# Patient Record
Sex: FEMALE | URBAN - METROPOLITAN AREA
[De-identification: names, ages, dates, MRNs, and addresses within clinical notes are randomized per-mention and may not be internally consistent; named-entity substitution may affect disease eponyms.]

---

## 2025-07-07 PROBLEM — Z00.00 ENCOUNTER FOR PREVENTIVE HEALTH EXAMINATION: Status: ACTIVE | Noted: 2025-07-07

## 2025-07-11 ENCOUNTER — EMERGENCY (EMERGENCY)
Facility: HOSPITAL | Age: 30
LOS: 0 days | Discharge: HOME | End: 2025-07-11
Attending: EMERGENCY MEDICINE
Payer: COMMERCIAL

## 2025-07-11 VITALS
WEIGHT: 156.09 LBS | HEART RATE: 90 BPM | DIASTOLIC BLOOD PRESSURE: 85 MMHG | OXYGEN SATURATION: 98 % | SYSTOLIC BLOOD PRESSURE: 130 MMHG | TEMPERATURE: 100 F | RESPIRATION RATE: 18 BRPM

## 2025-07-11 LAB
ALBUMIN SERPL ELPH-MCNC: 4.4 G/DL — SIGNIFICANT CHANGE UP (ref 3.5–5.2)
ALP SERPL-CCNC: 33 U/L — SIGNIFICANT CHANGE UP (ref 30–115)
ALT FLD-CCNC: 13 U/L — SIGNIFICANT CHANGE UP (ref 0–41)
ANION GAP SERPL CALC-SCNC: 11 MMOL/L — SIGNIFICANT CHANGE UP (ref 7–14)
APPEARANCE UR: CLEAR — SIGNIFICANT CHANGE UP
AST SERPL-CCNC: 17 U/L — SIGNIFICANT CHANGE UP (ref 0–41)
BASOPHILS # BLD AUTO: 0.04 K/UL — SIGNIFICANT CHANGE UP (ref 0–0.2)
BASOPHILS NFR BLD AUTO: 0.5 % — SIGNIFICANT CHANGE UP (ref 0–1)
BILIRUB SERPL-MCNC: 0.4 MG/DL — SIGNIFICANT CHANGE UP (ref 0.2–1.2)
BILIRUB UR-MCNC: NEGATIVE — SIGNIFICANT CHANGE UP
BUN SERPL-MCNC: 9 MG/DL — LOW (ref 10–20)
CALCIUM SERPL-MCNC: 9.4 MG/DL — SIGNIFICANT CHANGE UP (ref 8.4–10.5)
CHLORIDE SERPL-SCNC: 103 MMOL/L — SIGNIFICANT CHANGE UP (ref 98–110)
CO2 SERPL-SCNC: 24 MMOL/L — SIGNIFICANT CHANGE UP (ref 17–32)
COLOR SPEC: YELLOW — SIGNIFICANT CHANGE UP
CREAT SERPL-MCNC: 0.7 MG/DL — SIGNIFICANT CHANGE UP (ref 0.7–1.5)
DIFF PNL FLD: ABNORMAL
EGFR: 119 ML/MIN/1.73M2 — SIGNIFICANT CHANGE UP
EGFR: 119 ML/MIN/1.73M2 — SIGNIFICANT CHANGE UP
EOSINOPHIL # BLD AUTO: 0.23 K/UL — SIGNIFICANT CHANGE UP (ref 0–0.7)
EOSINOPHIL NFR BLD AUTO: 3 % — SIGNIFICANT CHANGE UP (ref 0–8)
GLUCOSE SERPL-MCNC: 90 MG/DL — SIGNIFICANT CHANGE UP (ref 70–99)
GLUCOSE UR QL: NEGATIVE MG/DL — SIGNIFICANT CHANGE UP
HCG SERPL-ACNC: 514.2 MIU/ML — HIGH
HCT VFR BLD CALC: 38.8 % — SIGNIFICANT CHANGE UP (ref 37–47)
HGB BLD-MCNC: 13.1 G/DL — SIGNIFICANT CHANGE UP (ref 12–16)
IMM GRANULOCYTES NFR BLD AUTO: 0.3 % — SIGNIFICANT CHANGE UP (ref 0.1–0.3)
KETONES UR QL: NEGATIVE MG/DL — SIGNIFICANT CHANGE UP
LEUKOCYTE ESTERASE UR-ACNC: NEGATIVE — SIGNIFICANT CHANGE UP
LYMPHOCYTES # BLD AUTO: 1.31 K/UL — SIGNIFICANT CHANGE UP (ref 1.2–3.4)
LYMPHOCYTES # BLD AUTO: 16.8 % — LOW (ref 20.5–51.1)
MCHC RBC-ENTMCNC: 31 PG — SIGNIFICANT CHANGE UP (ref 27–31)
MCHC RBC-ENTMCNC: 33.8 G/DL — SIGNIFICANT CHANGE UP (ref 32–37)
MCV RBC AUTO: 91.7 FL — SIGNIFICANT CHANGE UP (ref 81–99)
MONOCYTES # BLD AUTO: 0.37 K/UL — SIGNIFICANT CHANGE UP (ref 0.1–0.6)
MONOCYTES NFR BLD AUTO: 4.8 % — SIGNIFICANT CHANGE UP (ref 1.7–9.3)
NEUTROPHILS # BLD AUTO: 5.81 K/UL — SIGNIFICANT CHANGE UP (ref 1.4–6.5)
NEUTROPHILS NFR BLD AUTO: 74.6 % — SIGNIFICANT CHANGE UP (ref 42.2–75.2)
NITRITE UR-MCNC: NEGATIVE — SIGNIFICANT CHANGE UP
NRBC BLD AUTO-RTO: 0 /100 WBCS — SIGNIFICANT CHANGE UP (ref 0–0)
PH UR: 7.5 — SIGNIFICANT CHANGE UP (ref 5–8)
PLATELET # BLD AUTO: 186 K/UL — SIGNIFICANT CHANGE UP (ref 130–400)
PMV BLD: 11.2 FL — HIGH (ref 7.4–10.4)
POTASSIUM SERPL-MCNC: 4.1 MMOL/L — SIGNIFICANT CHANGE UP (ref 3.5–5)
POTASSIUM SERPL-SCNC: 4.1 MMOL/L — SIGNIFICANT CHANGE UP (ref 3.5–5)
PROT SERPL-MCNC: 7.4 G/DL — SIGNIFICANT CHANGE UP (ref 6–8)
PROT UR-MCNC: NEGATIVE MG/DL — SIGNIFICANT CHANGE UP
RBC # BLD: 4.23 M/UL — SIGNIFICANT CHANGE UP (ref 4.2–5.4)
RBC # FLD: 13 % — SIGNIFICANT CHANGE UP (ref 11.5–14.5)
SODIUM SERPL-SCNC: 138 MMOL/L — SIGNIFICANT CHANGE UP (ref 135–146)
SP GR SPEC: 1.01 — SIGNIFICANT CHANGE UP (ref 1–1.03)
UROBILINOGEN FLD QL: 0.2 MG/DL — SIGNIFICANT CHANGE UP (ref 0.2–1)
WBC # BLD: 7.78 K/UL — SIGNIFICANT CHANGE UP (ref 4.8–10.8)
WBC # FLD AUTO: 7.78 K/UL — SIGNIFICANT CHANGE UP (ref 4.8–10.8)

## 2025-07-11 PROCEDURE — 76856 US EXAM PELVIC COMPLETE: CPT | Mod: 26

## 2025-07-11 PROCEDURE — 76830 TRANSVAGINAL US NON-OB: CPT

## 2025-07-11 PROCEDURE — 76856 US EXAM PELVIC COMPLETE: CPT

## 2025-07-11 PROCEDURE — 99284 EMERGENCY DEPT VISIT MOD MDM: CPT | Mod: 25

## 2025-07-11 PROCEDURE — 86900 BLOOD TYPING SEROLOGIC ABO: CPT

## 2025-07-11 PROCEDURE — 36415 COLL VENOUS BLD VENIPUNCTURE: CPT

## 2025-07-11 PROCEDURE — 76830 TRANSVAGINAL US NON-OB: CPT | Mod: 26

## 2025-07-11 PROCEDURE — 84702 CHORIONIC GONADOTROPIN TEST: CPT

## 2025-07-11 PROCEDURE — 36000 PLACE NEEDLE IN VEIN: CPT

## 2025-07-11 PROCEDURE — 86850 RBC ANTIBODY SCREEN: CPT

## 2025-07-11 PROCEDURE — 99285 EMERGENCY DEPT VISIT HI MDM: CPT

## 2025-07-11 PROCEDURE — 87086 URINE CULTURE/COLONY COUNT: CPT

## 2025-07-11 PROCEDURE — 80053 COMPREHEN METABOLIC PANEL: CPT

## 2025-07-11 PROCEDURE — 85025 COMPLETE CBC W/AUTO DIFF WBC: CPT

## 2025-07-11 PROCEDURE — 86901 BLOOD TYPING SEROLOGIC RH(D): CPT

## 2025-07-11 PROCEDURE — 81001 URINALYSIS AUTO W/SCOPE: CPT

## 2025-07-11 RX ADMIN — Medication 1000 MILLILITER(S): at 10:00

## 2025-07-11 NOTE — ED PROVIDER NOTE - NSFOLLOWUPINSTRUCTIONS_ED_ALL_ED_FT
OBGYN  Our Emergency Department Referral Coordinators will be reaching out to you in the next 24-48 hours from 9:00am to 5:00pm with a follow up appointment. Please expect a phone call from the hospital in that time frame. If you do not receive a call or if you have any questions or concerns, you can reach them at   [phone]     Vaginal bleeding in early pregnancy  Email this page to a friend Print [employer]  Vaginal bleeding during pregnancy is any discharge of blood from the vagina. It can happen any time from conception (when the egg is fertilized) to the end of pregnancy.    Some women have vaginal bleeding during their first 20 weeks of pregnancy.    The Difference Between Spotting and Bleeding  Spotting is when you notice a few drops of blood every now and then on your underwear. It is not enough to cover a panty liner.    Bleeding is a heavier flow of blood. With bleeding, you will need a liner or pad to keep the blood from soaking your clothes.    Ask your health care provider more about the difference between spotting and bleeding at one of your first prenatal visits.    Should I Worry about Spotting?  Some spotting is normal very early in pregnancy. Still, it is a good idea to tell your provider about it.    If you have had an ultrasound that confirms you have a normal pregnancy, contact your provider the day you first see the spotting.    If you have spotting and have not yet had an ultrasound, contact your provider right away. Spotting can be a sign of a pregnancy where the fertilized egg develops outside the uterus (ectopic pregnancy). An untreated ectopic pregnancy can be life-threatening for the woman.    What Causes Vaginal Bleeding?  Bleeding in the 1st trimester is not always a problem. It may be caused by:    Having sex.  An infection.  The fertilized egg implanting in the uterus.  Hormone changes.  Other factors that will not harm the woman or baby.  A threatened miscarriage. Many threatened miscarriages do not progress to pregnancy loss.  More serious causes of first-trimester bleeding include:    A miscarriage, which is the loss of the pregnancy before the embryo or fetus can live on its own outside the uterus. Almost all women who miscarry will have bleeding before a miscarriage.  An ectopic pregnancy, which may cause bleeding and cramping.  A molar pregnancy, in which a fertilized egg implants in the uterus but will not come to term.  Formation of a blood clot between the amniotic sac and the wall of the womb called a subchorionic hematoma.  What Will My Provider Need to Know?  Your provider may need to know these things to find the cause of your vaginal bleeding:    How far along is your pregnancy?  Have you had vaginal bleeding during this or an earlier pregnancy?  When did your bleeding begin?  Does it stop and start, or is it a steady flow?  How much blood is there?  What is the color of the blood?  Does the blood have an odor?  Do you have cramps or pain?  Do you feel weak or tired?  Have you fainted or felt dizzy?  Do you have nausea, vomiting, or diarrhea?  Do you have a fever?  Have you been injured, such as in a fall?  Have you changed your physical activity?  Do you have any extra stress?  When did you last have sex? Did you bleed afterward?  What is your blood type? Your provider can test your blood type. If it is Rh negative, you will need treatment with a medicine called Rho(D) immune globulin to prevent complications with future pregnancies.  Treatment for Vaginal Bleeding  Most of the time, the treatment for bleeding is rest. It is important to see your provider and have testing done to find the cause of your bleeding. Your provider may advise you to:    Take time off work  Stay off your feet  Not have sex  Not douche (NEVER do this during pregnancy, and also avoid it when you are not pregnant)  Not use tampons  Very heavy bleeding may require a hospital stay or surgical procedure.    What if I Discharge More than Blood?  If something other than blood comes out, contact your provider right away. Your provider will do an exam to look at your cervix.    Your provider will check to see if you are still pregnant. You will be closely watched with blood tests to see if you are still pregnant.    If you are no longer pregnant, you may need more care from your provider, such as medicine or possibly surgery.    When to Call the Doctor  Contact or go to your provider right away if you have:    Heavy bleeding  Bleeding with pain or cramping  Dizziness and bleeding  Pain in your belly or pelvis  If you cannot reach your provider, go to the emergency room.    If your bleeding has stopped, you still need to contact your provider. Your provider will need to find out what caused your bleeding.

## 2025-07-11 NOTE — ED PROVIDER NOTE - PHYSICAL EXAMINATION
CONSTITUTIONAL: well-appearing, in NAD  SKIN: Warm dry, normal skin turgor  HEAD: NCAT  EYES: EOMI, PERRLA, no scleral icterus, conjunctiva pink  ENT: normal pharynx with no erythema or exudates  NECK: Supple; non tender. Full ROM.  CARD: RRR  RESP: clear to ausculation b/l. No crackles or wheezing.  ABD: soft, non-tender, non-distended, no rebound or guarding.  EXT: Full ROM,   NEURO: normal motor. normal sensory. Normal gait.  PSYCH: Cooperative, appropriate.

## 2025-07-11 NOTE — ED PROVIDER NOTE - NSFOLLOWUPCLINICS_GEN_ALL_ED_FT
[facility]  OB/GYN  [address] [place] [address]  Phone: [phone]  Fax:

## 2025-07-11 NOTE — ED ADULT NURSE NOTE - FINAL NURSING ELECTRONIC SIGNATURE
PAIN MANAGEMENT FOLLOW-UP NOTE  6/14/21    CHIEF COMPLAINT: This is a71 y.o. male patientwho returns to the Pain Management Clinic with a history of Back Pain (2 month, follow up from injection, radiate to dar legs)      PAIN HPI:Max Esparza returns today for  reevaluation. Since the visit, the patient reports that the pain is not changed. Still  Pain in B lumbar  Not better   worse when wakes up  From sleep    can  Ride a bicycle  But cant walk or stand      Location: Back  Location Modifier: entire back  Severity of Pain: 3  Duration of Pain: Intermittent  Frequency of Pain: Intermittent  Aggravating Factors: -  Limiting Behavior: Standing   Relieving Factors: relaxation        Previous pain medication trials have included:          Mental health:    Patient feels - secondary to their current pain problems as described above. H/O depression and anxiety: No   Patient is not seeing psychologist orpsychiatrist   Abuse history? No    Employed? No    ANALGESIA:   Are your Current Pain medication (s) helping to decrease pain? No.   Current Pain score: Pain Score:   8    ADVERSE AFFECTS:   Medication Side Effects: No.    ACTIVITY:  Are you able to be more active with your pain medications? No      ABERRANT BEHAVIORS SINCE LAST VISIT? No    Review of Systems   Constitutional: Positive for fatigue. HENT: Negative. Eyes: Negative. Respiratory: Negative. Cardiovascular: Negative. Gastrointestinal: Negative for constipation and nausea. Endocrine: Negative. Genitourinary: Negative for difficulty urinating. Musculoskeletal: Positive for arthralgias, back pain and myalgias. Skin: Negative. Allergic/Immunologic: Negative. Neurological: Positive for weakness and numbness. Hematological: Negative. Psychiatric/Behavioral: Positive for sleep disturbance. All other systems reviewed and are negative.        Allergies   Allergen Reactions    Atarax [Hydroxyzine]      Double vision    Gabapentin dizzy    Phenergan [Promethazine Hcl] Other (See Comments)     \"out of it\"    Morphine And Related Nausea And Vomiting     hallucinations       Outpatient Medications Prior to Visit   Medication Sig Dispense Refill    ibuprofen (ADVIL;MOTRIN) 200 MG tablet Take 200 mg by mouth every 6 hours as needed for Pain      simvastatin (ZOCOR) 20 MG tablet TAKE ONE TABLET BY MOUTH DAILY 90 tablet 1    HYDROcodone-acetaminophen (NORCO) 5-325 MG per tablet Take 1 tablet by mouth 2 times daily for 30 days. 60 tablet 0    metoprolol succinate (TOPROL XL) 50 MG extended release tablet TAKE ONE TABLET BY MOUTH DAILY 90 tablet 1    fenofibrate (TRICOR) 145 MG tablet TAKE ONE TABLET BY MOUTH DAILY 90 tablet 1    Multiple Vitamin (MULTI VITAMIN DAILY PO) Take by mouth      sertraline (ZOLOFT) 100 MG tablet TAKE ONE TABLET BY MOUTH DAILY 90 tablet 2    tamsulosin (FLOMAX) 0.4 MG capsule Take 1 capsule by mouth daily 90 capsule 3    aspirin 81 MG tablet Take 81 mg by mouth daily      acetaminophen (TYLENOL) 500 MG tablet Take 500 mg by mouth every 6 hours as needed. (Patient not taking: Reported on 6/14/2021)       No facility-administered medications prior to visit. Past Medical History:   Diagnosis Date    Agoraphobia     Anxiety     Arthritis     Basal cell carcinoma     Chronic pain     back    CKD (chronic kidney disease)     Claustrophobia     Enlarged lymph node     rt. lower neck.  Hearing aid worn     dar. ears.     Hearing loss     Hyperlipidemia     Hypertension     RAFAT (obstructive sleep apnea)     CPAP occasional    PONV (postoperative nausea and vomiting)     Retention of urine     last 2 surgeries, patient unable to void, home with catheter both times    SCC (squamous cell carcinoma)     HAND    Spinal stenosis, lumbar region, with neurogenic claudication     Wears glasses        Past Surgical History:   Procedure Laterality Date    BACK SURGERY  10/2013    Dr. Tavo Cope hardware lumbar    COLONOSCOPY  06/13/12    mild diverticular dz    LUMBAR FUSION N/A 3/8/2017    LUMBAR L2-3 POSTERIOR DECOMPRESSION FUSION INSTRUMENTATION W/REVISION L3-5 POSTERIOR DECOMPRESSION FUSION INSTRUMENTATION ([address] [place] [address])  CC SN/KILEY performed by Jennifer Flood MD at [address] [facility] Right     lower neck, 2-    PAIN MANAGEMENT PROCEDURE Bilateral 3/12/2021    Bilateral S1 TRANSFORAMINAL performed by Jammie Campa MD at [address] [place] [address] Bilateral 3/26/2021    Bilateral S1 TRANSFORAMINAL performed by Jammie Campa MD at [facility] N/A 1/26/2018    THORACIC & LUMBAR POSTERIOR DECOMPRESSION AND FUSION REVISION T9 THRU L5 performed by Jennifer Flood MD at [address] [facility] CA SCRN NOT  [address] IND N/A 8/23/2017    COLONOSCOPY performed by Kristal Washington MD at [address] [facility] N/A 11/14/2018    SPINAL CORD STIMULATOR IMPLANT PERMANENT performed by Jennifer Flood MD at [address] [facility] PRE-MALIGNANT / BENIGN SKIN LESION EXCISION      SKIN BIOPSY      skin cancer removed x 4    VASECTOMY       Family History   Problem Relation Age of Onset    Diabetes Mother     Diabetes Brother     Cancer Father         lung cancer    Cancer Brother         lung cancer    Diabetes Brother      Social History     Socioeconomic History    Marital status:      Spouse name: None    Number of children: None    Years of education: None    Highest education level: None   Occupational History    None   Tobacco Use    Smoking status: Never Smoker    Smokeless tobacco: Never Used   Vaping Use    Vaping Use: Never used   Substance and Sexual Activity    Alcohol use: No    Drug use: No    Sexual activity: None   Other Topics Concern    None   Social History Narrative    None     Social Determinants of Health     Financial Resource Strain:     Difficulty of Paying Living Expenses: Food Insecurity:     Worried About Running Out of Food in the Last Year:     [address] in the Last Year:    Transportation Needs:     Lack of Transportation (Medical):  Lack of Transportation (Non-Medical):    Physical Activity:     Days of Exercise per Week:     Minutes of Exercise per Session:    Stress:     Feeling of Stress :    Social Connections:     Frequency of Communication with Friends and Family:     Frequency of Social Gatherings with Friends and Family:     Attends Nondenominational Services:     Active Member of Clubs or Organizations:     Attends Club or Organization Meetings:     Marital Status:    Intimate Partner Violence:     Fear of Current or Ex-Partner:     Emotionally Abused:     Physically Abused:     Sexually Abused:          Family and Social Historyreviewed in the electronic medical record. Imaging:Reviewed available imaging in our system with the patient. No results found. Objective:     Physical Exam:  Vitals:    06/14/21 1406   BP: 115/82   Pulse: 62   SpO2: 91%   Weight: 217 lb (98.4 kg)   Height: 5' 9\" (1.753 m)     Pain Score:   8    Physical Exam  Constitutional:       Appearance: He is well-developed. HENT:      Head: Normocephalic and atraumatic. Cardiovascular:      Pulses: Normal pulses. Comments: Warm extremities. Normal capillary refill. Pulmonary:      Effort: Pulmonary effort is normal.   Abdominal:      General: Abdomen is flat. Palpations: Abdomen is soft. Musculoskeletal:      Lumbar back: Negative right straight leg raise test and negative left straight leg raise test.   Skin:     General: Skin is warm and dry. Neurological:      General: No focal deficit present. Mental Status: He is alert and oriented to person, place, and time. Cranial Nerves: No cranial nerve deficit. Sensory: No sensory deficit. Motor: No atrophy or abnormal muscle tone. Deep Tendon Reflexes: Reflexes are normal and symmetric. Psychiatric:         Mood and Affect: Mood normal.         Speech: Speech normal.         Behavior: Behavior normal.       Back Exam     Tenderness   The patient is experiencing tenderness in the lumbar. Range of Motion   Extension: normal   Flexion: normal   Lateral bend right: normal   Lateral bend left: normal   Rotation right: normal   Rotation left: normal     Muscle Strength   Right Quadriceps:  5/5   Left Quadriceps:  5/5   Right Hamstrings:  5/5   Left Hamstrings:  5/5     Tests   Straight leg raise right: negative  Straight leg raise left: negative    Other   Toe walk: normal  Heel walk: normal  Sensation: normal  Gait: normal     Comments:  +kemps   Mild slump B                                  Research  has found that  Spine injections    reduce pain and  give  better functional  outcomes. Assessment: This is a 67 y.o. male patient with:    Diagnosis:   Diagnosis Orders   1. Lumbar facet joint syndrome     2. Lumbosacral spondylosis without myelopathy     3. DDD (degenerative disc disease), lumbar         Medical Comorbidities:  As listed in the patient's past medical and surgical history    Functional Limitations:   Pain limits function and quality of life. Plan:    B L4-5 5-S1 facet steroid injections     Meds:   Controlled Substances Monitoring: Pt educated about the risks of taking opiates,including increased sedation, constipation, slowed breathing, tolerance, dependence,and addiction. New Prescriptions    No medications on file      No orders of the defined types were placed in this encounter. No orders of the defined types were placed in this encounter. No follow-ups on file. Opioid medication has  significant  risk  benefit concerns. We  instruct    our patients that  a Random Urine Drug Screen is required  along with a  an Opioid assessment questionaire such as ORT  or SOAPP  The patient expressed understanding of the above assessment and plan.     Totaltime spent 11-Jul-2025 15:14

## 2025-07-11 NOTE — CONSULT NOTE ADULT - ASSESSMENT
29yo  @6w5d by LMP presenting with vaginal spotting, possible small gestational sac noted on US, with probable early IUP, clinically and hemodynamically stable   - no acute GYN intervention   - bleeding precautions discussed   - ectopic precautions reviewed   - repeat bhcg ordered for  and  through allscripts   - will need repeat sonogram within 1-2 weeks   - f/u with primary GYN scheduled for , may repeat sonogram earlier   - dispo per ED

## 2025-07-11 NOTE — ED PROVIDER NOTE - PATIENT PORTAL LINK FT
You can access the FollowMyHealth Patient Portal offered by [facility] by registering at the following website: [email or web address]. By joining [facility]’s FollowMyHealth portal, you will also be able to view your health information using other applications (apps) compatible with our system.

## 2025-07-11 NOTE — ED ADULT TRIAGE NOTE - NS ED TRIAGE AVPU SCALE
Alert-The patient is alert, awake and responds to voice. The patient is oriented to time, place, and person. The triage nurse is able to obtain subjective information.
subjective fevers, vomiting, body aches

## 2025-07-11 NOTE — ED ADULT NURSE NOTE - OBJECTIVE STATEMENT
Pt is A+Ox4, amb with steady gait. Patient presents 6 weeks 5 days pregnant with complaints of vaginal spotting. Denies abdominal cramping, but does have pressure in lower abdomen. Patient states her LMP May 25, 2025. Pt called OBGYN but was told to come here since OB does not see pregnant patients in office until pregnancy is 9 weeks along.

## 2025-07-11 NOTE — ED PROVIDER NOTE - PROGRESS NOTE DETAILS
kz: Patient cleared from OB perspective.  Patient will follow-up with beta clinic and OB.  No other concerns at this time.  Return cautions given.  Patient agreeable to plan.

## 2025-07-11 NOTE — CONSULT NOTE ADULT - SUBJECTIVE AND OBJECTIVE BOX
[FreeTextEntry1] : X-rays repeated in the office today for right wrist show healing displaced distal radius fracture. Chief Complaint: vaginal spotting     HPI: 31yo  @6w5d LMP 24 PMH of asthma on albuterol presenting to the ED with a known positive pregnancy test 2 days ago complaining of some vaginal spotting., She reports using 1 pad overnight, with some spotting noted. She currently denies any abdominal pain, nausea, vomiting, HA, blurry vision, CP, SOB, abnormal vaginal discharge, dysuria, or hematuria       Ob/Gyn History:   LMP - 23, normal regular cycle  Denies history of ovarian cysts, uterine fibroids, abnormal paps. Reports remote history of chlamydia 10 years ago     Denies the following: constitutional symptoms, visual symptoms, cardiovascular symptoms, respiratory symptoms, GI symptoms, musculoskeletal symptoms, skin symptoms, neurologic symptoms, hematologic symptoms, allergic symptoms, psychiatric symptoms  Except any pertinent positives listed.     Home Medications:  Albuterol PRN     Allergies  No Known Allergies  Intolerances    PAST MEDICAL & SURGICAL HISTORY:  Asthma     SOCIAL HISTORY: Denies cigarette use, alcohol use, or illicit drug use    Vital Signs Last 24 Hrs  T(F): 99.8 (2025 09:39), Max: 99.8 (2025 09:39)  HR: 90 (2025 09:39) (90 - 90)  BP: 130/85 (2025 09:39) (130/85 - 130/85)  RR: 18 (2025 09:39) (18 - 18)    Weight (kg): 70.8 (25 @ 09:39)      General Appearance - AAOx3, NAD  Abdomen - Soft, nontender, nondistended, no rebound, no rigidity, no guarding  GYN/Pelvis: Normal external female genitalia   Speculum: Normal vaginal mucosa, ~5cc of dark brown blood noted, no active bleeding for cervical os, cervix appeared closed   Bimanual: ~5 week sized anteverted uterus, no adnexal masses palpated, no CMT or uterine tenderness noted     Meds:   sodium chloride 0.9% Bolus 1000 milliLiter(s) IV Bolus once      Weight (kg): 70.8 (25 @ 09:39)    LABS:                        13.1   7.78  )-----------( 186      ( 2025 09:57 )             38.8     HCG Quantitative, Serum: 514.2 mIU/mL (25 @ 09:57)          138  |  103  |  9[L]  ----------------------------<  90  4.1   |  24  |  0.7    Ca    9.4      2025 09:57    TPro  7.4  /  Alb  4.4  /  TBili  0.4  /  DBili  x   /  AST  17  /  ALT  13  /  AlkPhos  33        Urinalysis Basic - ( 2025 09:57 )    Color: Yellow / Appearance: Clear / S.012 / pH: x  Gluc: 90 mg/dL / Ketone: x  / Bili: Negative / Urobili: 0.2 mg/dL   Blood: x / Protein: Negative mg/dL / Nitrite: Negative   Leuk Esterase: Negative / RBC: 1 /HPF / WBC 1 /HPF   Sq Epi: x / Non Sq Epi: 3 /HPF / Bacteria: Negative /HPF          RADIOLOGY & ADDITIONAL STUDIES: < from: US Pelvis Complete (25 @ 11:47) >    PROCEDURE DATE:  2025          INTERPRETATION:  CLINICAL INFORMATION: Vaginal bleeding. Positive   pregnancy test.    LMP: 2025    COMPARISON: None available.    TECHNIQUE:  Endovaginal and transabdominal pelvic sonogram. Color and Spectral   Doppler was performed.    FINDINGS:  Uterus: 8.7 cm x 4.5 cm x 5.6 cm. Within normal limits.  Endometrium: 11 mm. Tiny gestational sac is seen. This is out of range   for measurement, less than 4 mm.    Right ovary: 3.2 cm x 2.3 cm x 2.1 cm. Within normal limits.  Left ovary: 4.2 cm x 2.2 cm x 3.4 cm. 2 cm corpus luteal cyst.    Fluid: None.    IMPRESSION:  Tiny gestational sac.    Left adnexal corpus luteal cyst.    < end of copied text >

## 2025-07-11 NOTE — ED PROVIDER NOTE - OBJECTIVE STATEMENT
Pt is a 29 yo F w/ PMH asthma presenting to ED for vaginal spotting. Pt is approximately Pt is a  29 yo F w/ PMH asthma presenting to ED for vaginal spotting. Pt is approximately 6 weeks pregnant. LMP . Pt has not had an OB appt yet and does not have a confirmed IUP. This morning pt states she started to have some vaginal spotting which progressed a bit. <1 pad a day. Pt is endorsing mild cramping. No NVD, fevers, chills, cp, sob

## 2025-07-13 LAB
CULTURE RESULTS: SIGNIFICANT CHANGE UP
SPECIMEN SOURCE: SIGNIFICANT CHANGE UP

## 2025-07-14 LAB — HCG SERPL-MCNC: 372.1 MIU/ML

## 2025-07-16 LAB — HCG SERPL-MCNC: 381.3 MIU/ML

## 2025-07-17 ENCOUNTER — EMERGENCY (EMERGENCY)
Facility: HOSPITAL | Age: 30
LOS: 0 days | Discharge: HOME | End: 2025-07-18
Attending: EMERGENCY MEDICINE
Payer: COMMERCIAL

## 2025-07-17 VITALS
HEART RATE: 81 BPM | DIASTOLIC BLOOD PRESSURE: 77 MMHG | TEMPERATURE: 99 F | RESPIRATION RATE: 18 BRPM | HEIGHT: 66 IN | OXYGEN SATURATION: 99 % | SYSTOLIC BLOOD PRESSURE: 119 MMHG | WEIGHT: 154.98 LBS

## 2025-07-17 LAB
ALBUMIN SERPL ELPH-MCNC: 4.4 G/DL — SIGNIFICANT CHANGE UP (ref 3.5–5.2)
ALP SERPL-CCNC: 35 U/L — SIGNIFICANT CHANGE UP (ref 30–115)
ALT FLD-CCNC: 10 U/L — SIGNIFICANT CHANGE UP (ref 0–41)
ANION GAP SERPL CALC-SCNC: 11 MMOL/L — SIGNIFICANT CHANGE UP (ref 7–14)
APPEARANCE UR: CLEAR — SIGNIFICANT CHANGE UP
APTT BLD: 28.8 SEC — SIGNIFICANT CHANGE UP (ref 27–39.2)
AST SERPL-CCNC: 15 U/L — SIGNIFICANT CHANGE UP (ref 0–41)
BACTERIA # UR AUTO: NEGATIVE /HPF — SIGNIFICANT CHANGE UP
BASOPHILS # BLD AUTO: 0.03 K/UL — SIGNIFICANT CHANGE UP (ref 0–0.2)
BASOPHILS NFR BLD AUTO: 0.4 % — SIGNIFICANT CHANGE UP (ref 0–1)
BILIRUB SERPL-MCNC: 0.2 MG/DL — SIGNIFICANT CHANGE UP (ref 0.2–1.2)
BILIRUB UR-MCNC: NEGATIVE — SIGNIFICANT CHANGE UP
BUN SERPL-MCNC: 16 MG/DL — SIGNIFICANT CHANGE UP (ref 10–20)
CALCIUM SERPL-MCNC: 9.4 MG/DL — SIGNIFICANT CHANGE UP (ref 8.4–10.5)
CAST: 0 /LPF — SIGNIFICANT CHANGE UP (ref 0–4)
CHLORIDE SERPL-SCNC: 102 MMOL/L — SIGNIFICANT CHANGE UP (ref 98–110)
CO2 SERPL-SCNC: 24 MMOL/L — SIGNIFICANT CHANGE UP (ref 17–32)
COLOR SPEC: YELLOW — SIGNIFICANT CHANGE UP
CREAT SERPL-MCNC: 0.9 MG/DL — SIGNIFICANT CHANGE UP (ref 0.7–1.5)
DIFF PNL FLD: ABNORMAL
EGFR: 88 ML/MIN/1.73M2 — SIGNIFICANT CHANGE UP
EGFR: 88 ML/MIN/1.73M2 — SIGNIFICANT CHANGE UP
EOSINOPHIL # BLD AUTO: 0.47 K/UL — SIGNIFICANT CHANGE UP (ref 0–0.7)
EOSINOPHIL NFR BLD AUTO: 6.6 % — SIGNIFICANT CHANGE UP (ref 0–8)
GLUCOSE SERPL-MCNC: 83 MG/DL — SIGNIFICANT CHANGE UP (ref 70–99)
GLUCOSE UR QL: NEGATIVE MG/DL — SIGNIFICANT CHANGE UP
HCG SERPL-ACNC: 379.7 MIU/ML — HIGH
HCT VFR BLD CALC: 37.6 % — SIGNIFICANT CHANGE UP (ref 37–47)
HGB BLD-MCNC: 12.5 G/DL — SIGNIFICANT CHANGE UP (ref 12–16)
IMM GRANULOCYTES NFR BLD AUTO: 0.1 % — SIGNIFICANT CHANGE UP (ref 0.1–0.3)
INR BLD: 0.91 RATIO — SIGNIFICANT CHANGE UP (ref 0.65–1.3)
KETONES UR QL: NEGATIVE MG/DL — SIGNIFICANT CHANGE UP
LEUKOCYTE ESTERASE UR-ACNC: NEGATIVE — SIGNIFICANT CHANGE UP
LYMPHOCYTES # BLD AUTO: 2.22 K/UL — SIGNIFICANT CHANGE UP (ref 1.2–3.4)
LYMPHOCYTES # BLD AUTO: 31.3 % — SIGNIFICANT CHANGE UP (ref 20.5–51.1)
MCHC RBC-ENTMCNC: 30.6 PG — SIGNIFICANT CHANGE UP (ref 27–31)
MCHC RBC-ENTMCNC: 33.2 G/DL — SIGNIFICANT CHANGE UP (ref 32–37)
MCV RBC AUTO: 91.9 FL — SIGNIFICANT CHANGE UP (ref 81–99)
MONOCYTES # BLD AUTO: 0.38 K/UL — SIGNIFICANT CHANGE UP (ref 0.1–0.6)
MONOCYTES NFR BLD AUTO: 5.4 % — SIGNIFICANT CHANGE UP (ref 1.7–9.3)
NEUTROPHILS # BLD AUTO: 3.98 K/UL — SIGNIFICANT CHANGE UP (ref 1.4–6.5)
NEUTROPHILS NFR BLD AUTO: 56.2 % — SIGNIFICANT CHANGE UP (ref 42.2–75.2)
NITRITE UR-MCNC: NEGATIVE — SIGNIFICANT CHANGE UP
NRBC BLD AUTO-RTO: 0 /100 WBCS — SIGNIFICANT CHANGE UP (ref 0–0)
PH UR: 6.5 — SIGNIFICANT CHANGE UP (ref 5–8)
PLATELET # BLD AUTO: 204 K/UL — SIGNIFICANT CHANGE UP (ref 130–400)
PMV BLD: 11.4 FL — HIGH (ref 7.4–10.4)
POTASSIUM SERPL-MCNC: 4.6 MMOL/L — SIGNIFICANT CHANGE UP (ref 3.5–5)
POTASSIUM SERPL-SCNC: 4.6 MMOL/L — SIGNIFICANT CHANGE UP (ref 3.5–5)
PROT SERPL-MCNC: 7 G/DL — SIGNIFICANT CHANGE UP (ref 6–8)
PROT UR-MCNC: NEGATIVE MG/DL — SIGNIFICANT CHANGE UP
PROTHROM AB SERPL-ACNC: 10.7 SEC — SIGNIFICANT CHANGE UP (ref 9.95–12.87)
RBC # BLD: 4.09 M/UL — LOW (ref 4.2–5.4)
RBC # FLD: 12.8 % — SIGNIFICANT CHANGE UP (ref 11.5–14.5)
RBC CASTS # UR COMP ASSIST: 1 /HPF — SIGNIFICANT CHANGE UP (ref 0–4)
SODIUM SERPL-SCNC: 137 MMOL/L — SIGNIFICANT CHANGE UP (ref 135–146)
SP GR SPEC: 1.01 — SIGNIFICANT CHANGE UP (ref 1–1.03)
SQUAMOUS # UR AUTO: 6 /HPF — HIGH (ref 0–5)
UROBILINOGEN FLD QL: 0.2 MG/DL — SIGNIFICANT CHANGE UP (ref 0.2–1)
WBC # BLD: 7.09 K/UL — SIGNIFICANT CHANGE UP (ref 4.8–10.8)
WBC # FLD AUTO: 7.09 K/UL — SIGNIFICANT CHANGE UP (ref 4.8–10.8)
WBC UR QL: 2 /HPF — SIGNIFICANT CHANGE UP (ref 0–5)

## 2025-07-17 PROCEDURE — 76830 TRANSVAGINAL US NON-OB: CPT | Mod: 26

## 2025-07-17 PROCEDURE — 80053 COMPREHEN METABOLIC PANEL: CPT

## 2025-07-17 PROCEDURE — 85610 PROTHROMBIN TIME: CPT

## 2025-07-17 PROCEDURE — 84702 CHORIONIC GONADOTROPIN TEST: CPT

## 2025-07-17 PROCEDURE — 85025 COMPLETE CBC W/AUTO DIFF WBC: CPT

## 2025-07-17 PROCEDURE — 99284 EMERGENCY DEPT VISIT MOD MDM: CPT | Mod: 25

## 2025-07-17 PROCEDURE — 81001 URINALYSIS AUTO W/SCOPE: CPT

## 2025-07-17 PROCEDURE — 99285 EMERGENCY DEPT VISIT HI MDM: CPT

## 2025-07-17 PROCEDURE — 85730 THROMBOPLASTIN TIME PARTIAL: CPT

## 2025-07-17 PROCEDURE — 36415 COLL VENOUS BLD VENIPUNCTURE: CPT

## 2025-07-17 PROCEDURE — 76830 TRANSVAGINAL US NON-OB: CPT

## 2025-07-17 PROCEDURE — 96401 CHEMO ANTI-NEOPL SQ/IM: CPT

## 2025-07-17 PROCEDURE — 99283 EMERGENCY DEPT VISIT LOW MDM: CPT

## 2025-07-17 RX ORDER — METHOTREXATE 25 MG/ML
100 INJECTION, SOLUTION INTRA-ARTERIAL; INTRAMUSCULAR; INTRATHECAL; INTRAVENOUS ONCE
Refills: 0 | Status: DISCONTINUED | OUTPATIENT
Start: 2025-07-17 | End: 2025-07-17

## 2025-07-17 RX ORDER — METHOTREXATE 25 MG/ML
87.5 INJECTION, SOLUTION INTRA-ARTERIAL; INTRAMUSCULAR; INTRATHECAL; INTRAVENOUS ONCE
Refills: 0 | Status: DISCONTINUED | OUTPATIENT
Start: 2025-07-17 | End: 2025-07-17

## 2025-07-17 RX ORDER — METHOTREXATE 25 MG/ML
90 INJECTION, SOLUTION INTRA-ARTERIAL; INTRAMUSCULAR; INTRATHECAL; INTRAVENOUS ONCE
Refills: 0 | Status: COMPLETED | OUTPATIENT
Start: 2025-07-17 | End: 2025-07-17

## 2025-07-17 NOTE — ED PROVIDER NOTE - CARE PROVIDERS DIRECT ADDRESSES
[email or web address]

## 2025-07-17 NOTE — ED PROVIDER NOTE - NSFOLLOWUPINSTRUCTIONS_ED_ALL_ED_FT
Ectopic Pregnancy    WHAT YOU NEED TO KNOW:    What is an ectopic pregnancy? Ectopic pregnancy occurs when a fertilized egg attaches and begins to grow outside of the uterus. The most common place for this to happen is in the fallopian tube. This is sometimes called a tubal pregnancy. The egg can also implant on the outside of the uterus, on the ovary or cervix, or in the abdomen. The egg may begin to grow, but the pregnancy cannot continue normally. Ectopic pregnancy can cause heavy bleeding and may be life-threatening.    What increases my risk for an ectopic pregnancy?    Age older than 35 years    An infection of the reproductive organs such as pelvic inflammatory disease (PID)    A past ectopic pregnancy, or past fertility problems    Fallopian tube injury or damage    Getting pregnant when you have an intrauterine device (IUD)    Past surgery in your abdomen or on your reproductive organs    Medicines to treat infertility or that contain female hormones    Certain fertility treatments, such as multiple embryo transplant    Smoking cigarettes  What are the signs and symptoms of ectopic pregnancy?    One-sided abdominal or pelvic pain and cramping    Vaginal bleeding or spotting that happens about 7 weeks after your missed period    Nausea or vomiting    Dizziness, weakness, or fainting    Tissue coming out of your vagina  How is ectopic pregnancy diagnosed? Your healthcare provider will examine you and ask about other medical conditions or past surgeries. Tell the provider about any previous pregnancies, miscarriages, infertility treatments, and sexually transmitted infections (STIs). You may need any of the following:    A pelvic exam is used to check the size and shape of your uterus, cervix, and ovaries.    Blood and urine tests will show if you are currently pregnant, or if you have infections or other problems.    Ultrasound pictures may be taken of the inside of your uterus, ovaries, and abdomen. An ultrasound is usually done over your abdomen, but you may also need a vaginal ultrasound. During a vaginal ultrasound, a small tube is placed into your vagina. This can help healthcare providers see areas that may be hard to see during an abdominal ultrasound.  How is ectopic pregnancy treated? Most ectopic pregnancies will need immediate treatment. Your body may absorb the pregnancy tissues and your symptoms may decrease without any treatment. You may need any of the following:    Medicine called methotrexate may be given to stop the pregnancy. This may be given as an injection. You may need more than one dose. It is important to follow up with your healthcare provider as directed if you receive this medicine.    Surgery may be done to repair or remove tissue or ruptured fallopian tubes. Your healthcare provider will talk to you about possible kinds of surgery. Your provider will consider where the ectopic pregnancy is located and the damage it caused. Talk to your provider about your desire to have children in the future. Some kinds of surgery will prevent future pregnancy.  Where can I get support and more information?    The [employer]  [address] [address]  [place] [address]  Phone: [phone]  Phone: [phone]  Web Address: [email or web address]  Call your local emergency number (911 in the [place]) if:    You have chest pain or trouble breathing.    Call your doctor if:    You have sharp pain in your lower abdomen that is severe and starts suddenly.    You feel lightheaded or like you are going to faint.    You have increasing abdominal or pelvic pain.    You have new or worsening vaginal bleeding.    You have shoulder pain.    You have a fever.    You have questions or concerns about your condition or care.  CARE AGREEMENT:    You have the right to help plan your care. Learn about your health condition and how it may be treated. Discuss treatment options with your healthcare providers to decide what care you want to receive. You always have the right to refuse treatment.

## 2025-07-17 NOTE — ED PROVIDER NOTE - CLINICAL SUMMARY MEDICAL DECISION MAKING FREE TEXT BOX
Catherine: Signout received from Dr. Chacko. Patient presents for evaluation of vaginal bleeding.  Required labs and imaging.  Results show show  decreasing hCG levels with an IUP focus, no yolk sac.  OB/GYN consulted and they recommended to give methotrexate and discharged outpatient follow-up

## 2025-07-17 NOTE — ED PROVIDER NOTE - CARE PROVIDER_API CALL
Romaine Lowe ()  Obstetrics & Gynecology  [address] [place] [id number]  Phone: [phone]  Fax: [phone]  Follow Up Time: 1-3 Days

## 2025-07-17 NOTE — ED PROVIDER NOTE - PATIENT PORTAL LINK FT
You can access the FollowMyHealth Patient Portal offered by [facility] by registering at the following website: [email or web address]. By joining [facility]’s FollowMyHealth portal, you will also be able to view your health information using other applications (apps) compatible with our system. You can access the FollowMyHealth Patient Portal offered by [facility] by registering at the following website: [email or web address]. By joining [facility]’s FollowMyHealth portal, you will also be able to view your health information using other applications (apps) compatible with our system.

## 2025-07-17 NOTE — ED PROVIDER NOTE - OBJECTIVE STATEMENT
29 yo F 6 weeks pregnant with no pmhx sent by obgyn to rule out ectopic. Patient has no complaints. No cp, sob, fever, chills, abdominal pain, nausea, vomiting, diarrhea, back pain, urinary symptoms, headache, dizziness, paresthesias, or weakness.

## 2025-07-17 NOTE — CONSULT NOTE ADULT - SUBJECTIVE AND OBJECTIVE BOX
29yo  LMP 25 PMH of asthma on albuterol presenting to the ED with a known positive pregnancy test-pt previously seen , No acute pain, no fever no n/v    ob hx: no deliveries  gyn hx: neg  Med hx: none  surg hx denies  allergies none    vitals reviewed    exam  NAD  abd: soft, nt, nd, nop pain  pelvic exam-normal external, no bleeding, no cmt, uterus non tender andenxa no masses no pain    bhcg delcined from previous measurement  sonogram: fluid in canal-no adnexal masses, no definitive IUP    A; PUL -Pregnancy unknown location  discussed with pt ddx;  failed iup vs ectopic  options    1-d/c with path +/- villi determines next step  surgery risks explained    2-mtx  not 100%, may not be ectopic  risks and mTX consent reviewied with pt-see sheet for risks  explained need for day 4 and 7 bchg  ectopic precautions  return if any pain  ED attending-case discussed

## 2025-07-17 NOTE — ED PROVIDER NOTE - ATTENDING APP SHARED VISIT CONTRIBUTION OF CARE
30-year-old female no past med history currently 6 weeks pregnant presents for possible ectopic.  A few days ago patient was having vaginal bleeding.  Had labs and sonogram done.  Seen by OB and placed on beta list.  Outpatient beta level not increasing appropriately.  Sent by OB for evaluation for ectopic.  Patient states that her bleeding improved.  Denies any pain at this time.    CONSTITUTIONAL: Well-developed; well-nourished; in no acute distress.   SKIN: warm, dry  HEAD: Normocephalic; atraumatic.  EYES: PERRL, EOMI, no conjunctival erythema  ENT: No nasal discharge; airway clear.  ABD: soft non tender, non distended, no rebound or guarding  EXT: Normal ROM.  5/5 strength in all 4 extremities   NEURO: Alert, oriented, grossly unremarkable. neurovascularly intact  PSYCH: Cooperative, appropriate.

## 2025-07-17 NOTE — CHART NOTE - NSCHARTNOTEFT_GEN_A_CORE
PGY2 Note    Called patient to remind to discuss lab results. No response, voicemail left with instructions to call back and discuss how patient is feeling and possible next steps. Call back information provided.     Dr. Butler to be aware
PGY2 Note    Called patient to remind to repeat hCG hormone today. Pt reports no bleeding today, denies any abdominal pain. Pt had already went to the lab to repeat her blood work. Call back information provided for any questions or concerns.     Dr. Butler to be aware
PGY2 Note    Called patient to remind to repeat hCG hormone today. Pt reports continued vaginal bleeding, using 1-2 pads per day. She denies any severe abdominal pain, nausea or vomiting. Pt reports that she went to the lab. Will follow up the result.     Dr. Butler to be aware
patient self scheduled follow up

## 2025-07-18 PROBLEM — O36.80X0 PREGNANCY, LOCATION UNKNOWN: Status: ACTIVE | Noted: 2025-07-18

## 2025-07-18 RX ADMIN — METHOTREXATE 90 MILLIGRAM(S): 25 INJECTION, SOLUTION INTRA-ARTERIAL; INTRAMUSCULAR; INTRATHECAL; INTRAVENOUS at 00:09

## 2025-07-23 LAB — HCG SERPL-MCNC: 365.7 MIU/ML

## 2025-07-24 LAB — HCG SERPL-MCNC: 266.9 MIU/ML

## 2025-07-25 NOTE — CHART NOTE - NSCHARTNOTEFT_GEN_A_CORE
PGY2 Note    Pt called to assess wellbeing and to remind her to present for bhcg appointment on 7/24. Pt able to be reached, feels physically well, denies abdominal pain, vaginal bleeding, CP, SOB, n/v, dizziness, palpitations. Questions regarding PNV answered. Pt expressed understanding and ability to present for bhcg lab draw.   Ectopic and bleeding precautions reviewed
PGY2 Note    Pt called to assess wellbeing and to remind her to present for [facility] appointment today. Pt already presented to lab draw appointment this AM. Pt able to be reached, feels physically well, noticed scant vaginal spotting starting last night. Pt has not saturated any pads or liners. Currentl denies abdominal pain, vaginal bleeding, CP, SOB, n/v, dizziness, palpitations.   Ectopic and bleeding precautions reviewed.
PGY2 Note    Pt called to assess wellbeing and to remind her to present for bhcg appointment on 7/31. Pt able to be reached. Pt updated on Day 7 MTX value, counseled that medication is working to help resolve the ectopic pregnancy and she will need to follow up weekly bhcg values from now on. Pt expressed understanding. Endorses scant vaginal bleeding now, unchanged from previous. Denies CP, SOB, abdominal pain, dizziness.   All questions answered.   Ectopic and bleeding precautions reviewed.
attempted to reach patient to assess well being, unable to leave voicemail.

## 2025-07-29 ENCOUNTER — APPOINTMENT (OUTPATIENT)
Dept: OBGYN | Facility: CLINIC | Age: 30
End: 2025-07-29

## 2025-07-31 LAB — HCG SERPL-MCNC: 34.8 MIU/ML

## 2025-08-08 LAB — HCG SERPL-MCNC: 1 MIU/ML
